# Patient Record
(demographics unavailable — no encounter records)

---

## 2024-11-25 NOTE — ASSESSMENT
[FreeTextEntry1] : We will do routine blood work in the form of CBC, CMP, A1c, lipid, TSH, B12 folate at this point. he may follow-up earlier if needed Pt was told to call with problems or concerns. The patient was told to seek immediate attention by calling 911 or going to the ER if his condition does not improve or gets acutely worse. Told to follow-up in 2 weeks for BP check and repeat CMP testing

## 2024-11-25 NOTE — PHYSICAL EXAM
[Normal] : normal rate, regular rhythm, normal S1 and S2 and no murmur heard [No Varicosities] : no varicosities [Pedal Pulses Present] : the pedal pulses are present [No Edema] : there was no peripheral edema [No Extremity Clubbing/Cyanosis] : no extremity clubbing/cyanosis [Soft] : abdomen soft [Non Tender] : non-tender [Non-distended] : non-distended [Normal Bowel Sounds] : normal bowel sounds [de-identified] : wax noted in the right ear  [de-identified] : Minimal swelling noted in the lower extremity bilaterally

## 2024-11-25 NOTE — HISTORY OF PRESENT ILLNESS
Detail Level: Detailed [FreeTextEntry1] : check up [de-identified] : 45-year-old male is here for checkup.  This is my first time seeing this patient.  Patient denies any fevers, chills, nausea, vomiting, diarrhea, constipation, chest pain, shortness of breath, weakness, numbness, tingling at this time.  Patient reports that he has had 6 COVID vaccines in the past.  Alcohol: Endorses. Patient reports that they drink alcohol occasionally (once a month).  Smoking: Denies. Patient reports that they have never smoked cigarettes.  Illicit Drugs: Denies. Patient reports that they do not use any recreational drugs.  Colonoscopy: Patient reports that they never had a colonoscopy.  He is interested in getting colon cancer screening via colonoscopy at this point. Diet: Patient reports that they are not that good with diet  Exercise: Patient reports that they are not good with exercise Living Situation: Alone.  Patient reports that they live alone.  Employment Status: Employed. 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  Education: Reports that the highest level of education is Graduate school (DNP). Relationship Status: Single.  Number of kids : 0  ADLs: Fully functional (bathing, dressing, toileting, transferring, walking, feeding).  Patient reports that they can perform ADLs IADLs: Fully functional and needs no help or supervision to perform IADLs (using the telephone, shopping, preparing meals, housekeeping, doing laundry, using transportation, managing medications and managing finances).  Patient reports that they can perform IADLs.

## 2024-12-17 NOTE — PHYSICAL EXAM
[Normal] : normal rate, regular rhythm, normal S1 and S2 and no murmur heard [No Varicosities] : no varicosities [Pedal Pulses Present] : the pedal pulses are present [No Edema] : there was no peripheral edema [No Extremity Clubbing/Cyanosis] : no extremity clubbing/cyanosis [Soft] : abdomen soft [Non Tender] : non-tender [Non-distended] : non-distended [Normal Bowel Sounds] : normal bowel sounds [de-identified] : wax noted in the right ear  [de-identified] : Minimal swelling noted in the lower extremity bilaterally

## 2024-12-17 NOTE — HISTORY OF PRESENT ILLNESS
[FreeTextEntry1] : follow up [de-identified] : 45-year-old male is here for checkup.  This is my first time seeing this patient.  Patient denies any fevers, chills, nausea, vomiting, diarrhea, constipation, chest pain, shortness of breath, weakness, numbness, tingling at this time.  Patient reports that he has had 6 COVID vaccines in the past.  Alcohol: Endorses. Patient reports that they drink alcohol occasionally (once a month).  Smoking: Denies. Patient reports that they have never smoked cigarettes.  Illicit Drugs: Denies. Patient reports that they do not use any recreational drugs.  Colonoscopy: Patient reports that they never had a colonoscopy.  He is interested in getting colon cancer screening via colonoscopy at this point.  He was given a referral to see the GI doctor in . Diet: Patient reports that they are not that good with diet.  Exercise: Patient reports that they are not good with exercise Living Situation: Alone.  Patient reports that they live alone.  Employment Status: Employed. 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  Education: Reports that the highest level of education is Graduate school (DNP). Relationship Status: Single.  Number of kids : 0  ADLs: Fully functional (bathing, dressing, toileting, transferring, walking, feeding).  Patient reports that they can perform ADLs IADLs: Fully functional and needs no help or supervision to perform IADLs (using the telephone, shopping, preparing meals, housekeeping, doing laundry, using transportation, managing medications and managing finances).  Patient reports that they can perform IADLs.

## 2024-12-17 NOTE — PHYSICAL EXAM
[Normal] : normal rate, regular rhythm, normal S1 and S2 and no murmur heard [No Varicosities] : no varicosities [Pedal Pulses Present] : the pedal pulses are present [No Edema] : there was no peripheral edema [No Extremity Clubbing/Cyanosis] : no extremity clubbing/cyanosis [Soft] : abdomen soft [Non Tender] : non-tender [Non-distended] : non-distended [Normal Bowel Sounds] : normal bowel sounds [de-identified] : wax noted in the right ear  [de-identified] : Minimal swelling noted in the lower extremity bilaterally

## 2024-12-17 NOTE — HISTORY OF PRESENT ILLNESS
[FreeTextEntry1] : follow up [de-identified] : 45-year-old male is here for checkup.  This is my first time seeing this patient.  Patient denies any fevers, chills, nausea, vomiting, diarrhea, constipation, chest pain, shortness of breath, weakness, numbness, tingling at this time.  Patient reports that he has had 6 COVID vaccines in the past.  Alcohol: Endorses. Patient reports that they drink alcohol occasionally (once a month).  Smoking: Denies. Patient reports that they have never smoked cigarettes.  Illicit Drugs: Denies. Patient reports that they do not use any recreational drugs.  Colonoscopy: Patient reports that they never had a colonoscopy.  He is interested in getting colon cancer screening via colonoscopy at this point.  He was given a referral to see the GI doctor in . Diet: Patient reports that they are not that good with diet.  Exercise: Patient reports that they are not good with exercise Living Situation: Alone.  Patient reports that they live alone.  Employment Status: Employed. 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  Education: Reports that the highest level of education is Graduate school (DNP). Relationship Status: Single.  Number of kids : 0  ADLs: Fully functional (bathing, dressing, toileting, transferring, walking, feeding).  Patient reports that they can perform ADLs IADLs: Fully functional and needs no help or supervision to perform IADLs (using the telephone, shopping, preparing meals, housekeeping, doing laundry, using transportation, managing medications and managing finances).  Patient reports that they can perform IADLs.

## 2025-01-17 NOTE — PHYSICAL EXAM
[Alert] : alert [Well Nourished] : well nourished [Obese] : obese [No Acute Distress] : no acute distress [Well Developed] : well developed [Normal Sclera/Conjunctiva] : normal sclera/conjunctiva [EOMI] : extra ocular movement intact [No Proptosis] : no proptosis [Normal Oropharynx] : the oropharynx was normal [Thyroid Not Enlarged] : the thyroid was not enlarged [No Thyroid Nodules] : no palpable thyroid nodules [No Respiratory Distress] : no respiratory distress [No Accessory Muscle Use] : no accessory muscle use [Clear to Auscultation] : lungs were clear to auscultation bilaterally [Normal S1, S2] : normal S1 and S2 [Normal Rate] : heart rate was normal [Regular Rhythm] : with a regular rhythm [No Edema] : no peripheral edema [Pedal Pulses Normal] : the pedal pulses are present [Normal Bowel Sounds] : normal bowel sounds [Not Tender] : non-tender [Not Distended] : not distended [Soft] : abdomen soft [Normal Anterior Cervical Nodes] : no anterior cervical lymphadenopathy [Normal Posterior Cervical Nodes] : no posterior cervical lymphadenopathy [No Spinal Tenderness] : no spinal tenderness [Spine Straight] : spine straight [No Stigmata of Cushings Syndrome] : no stigmata of Cushings Syndrome [Normal Gait] : normal gait [Normal Strength/Tone] : muscle strength and tone were normal [No Rash] : no rash [Acanthosis Nigricans] : no acanthosis nigricans [Normal Reflexes] : deep tendon reflexes were 2+ and symmetric [No Tremors] : no tremors [Oriented x3] : oriented to person, place, and time [de-identified] : healed scars, likely keyhole incisions

## 2025-01-17 NOTE — HISTORY OF PRESENT ILLNESS
[FreeTextEntry1] : 45 year M here for assessment of obesity  Patient is concerned about gradual generalized weight gain.    Follows a special diet: None Food Cravings: No Tried to lose weight before: Yes Tried any diet plans/ meal replacements for weight control: No Tried OTC or prescription medication for weight control: No   Activity level: typically  light activity with no scheduled physical activity      Ease of skin bruising: No Proximal muscle weakness: No Prior weight loss surgery: Sleeve gastrectomy with weight regain    Known history of the following:   Thyroid disease: No Heart disease: No Mood disorder: No Hypertension: yes Seizures: No Gall bladder disease: No Pancreatitis: No

## 2025-03-17 NOTE — ASSESSMENT
[FreeTextEntry1] : he may follow-up earlier if needed Pt was told to call with problems or concerns. The patient was told to seek immediate attention by calling 911 or going to the ER if his condition does not improve or gets acutely worse.  A total of 22 minutes was spent on this encounter.

## 2025-03-17 NOTE — HISTORY OF PRESENT ILLNESS
[FreeTextEntry1] : follow up for HTN [de-identified] : 45-year-old male is here for a follow-up appointment.  Patient is here for follow-up for medical comorbidities / medical problems.  Patient denies any fevers, chills, nausea, vomiting, diarrhea, constipation, chest pain, shortness of breath, weakness, numbness, tingling at this time.  Patient reports that he has had 6 COVID vaccines in the past.  Alcohol: Endorses. Patient reports that they drink alcohol occasionally (once a month).  Smoking: Denies. Patient reports that they have never smoked cigarettes.  Illicit Drugs: Denies. Patient reports that they do not use any recreational drugs.  Colonoscopy: Patient reports that they never had a colonoscopy.  He is interested in getting colon cancer screening via colonoscopy at this point.  He was given a referral to see the GI doctor in . Diet: Patient reports that they are not that good with diet.  Exercise: Patient reports that they are not good with exercise Living Situation: Alone.  Patient reports that they live alone.  Employment Status: Employed. 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  Education: Reports that the highest level of education is Graduate school (DNP). Relationship Status: Single.  Number of kids : 0  ADLs: Fully functional (bathing, dressing, toileting, transferring, walking, feeding).  Patient reports that they can perform ADLs IADLs: Fully functional and needs no help or supervision to perform IADLs (using the telephone, shopping, preparing meals, housekeeping, doing laundry, using transportation, managing medications and managing finances).  Patient reports that they can perform IADLs.

## 2025-03-17 NOTE — PHYSICAL EXAM
[Normal] : normal rate, regular rhythm, normal S1 and S2 and no murmur heard [No Varicosities] : no varicosities [Pedal Pulses Present] : the pedal pulses are present [No Edema] : there was no peripheral edema [No Extremity Clubbing/Cyanosis] : no extremity clubbing/cyanosis [Soft] : abdomen soft [Non Tender] : non-tender [Non-distended] : non-distended [Normal Bowel Sounds] : normal bowel sounds [de-identified] : wax noted in the right ear  [de-identified] : Minimal swelling noted in the lower extremity bilaterally

## 2025-04-03 NOTE — PHYSICAL EXAM
[Alert] : alert [Well Nourished] : well nourished [Obese] : obese [No Acute Distress] : no acute distress [Well Developed] : well developed [Normal Sclera/Conjunctiva] : normal sclera/conjunctiva [EOMI] : extra ocular movement intact [No Proptosis] : no proptosis [Normal Oropharynx] : the oropharynx was normal [Thyroid Not Enlarged] : the thyroid was not enlarged [No Thyroid Nodules] : no palpable thyroid nodules [No Respiratory Distress] : no respiratory distress [No Accessory Muscle Use] : no accessory muscle use [Clear to Auscultation] : lungs were clear to auscultation bilaterally [Normal S1, S2] : normal S1 and S2 [Normal Rate] : heart rate was normal [Regular Rhythm] : with a regular rhythm [No Edema] : no peripheral edema [Pedal Pulses Normal] : the pedal pulses are present [Normal Bowel Sounds] : normal bowel sounds [Not Tender] : non-tender [Not Distended] : not distended [Soft] : abdomen soft [Normal Anterior Cervical Nodes] : no anterior cervical lymphadenopathy [No Spinal Tenderness] : no spinal tenderness [Spine Straight] : spine straight [No Stigmata of Cushings Syndrome] : no stigmata of Cushings Syndrome [Normal Gait] : normal gait [Normal Strength/Tone] : muscle strength and tone were normal [No Rash] : no rash [Normal Reflexes] : deep tendon reflexes were 2+ and symmetric [No Tremors] : no tremors [Oriented x3] : oriented to person, place, and time [Acanthosis Nigricans] : no acanthosis nigricans [de-identified] : healed scars, likely keyhole incisions

## 2025-04-03 NOTE — HISTORY OF PRESENT ILLNESS
[FreeTextEntry1] : 45 year M here for assessment of obesity  Prescribed Zebpound, currently on 5mg Q qweekly (no n/v/d)   Exercise: limited by MSK issue in [place] Diet:  smoothies     Known history of the following:   Thyroid disease: No Heart disease: No Mood disorder: No Hypertension: yes Seizures: No Gall bladder disease: No Pancreatitis: No

## 2025-07-03 NOTE — HISTORY OF PRESENT ILLNESS
[FreeTextEntry1] : 45 year M here for assessment of obesity  Prescribed Zebpound, currently on 10mg Q qweekly (no n/v/d)   Exercise: limited by MSK issue in [place] Diet:  smoothies     Known history of the following:   Thyroid disease: No Heart disease: No Mood disorder: No Hypertension: yes Seizures: No Gall bladder disease: No Pancreatitis: No

## 2025-07-03 NOTE — PHYSICAL EXAM
[Alert] : alert [Well Nourished] : well nourished [Obese] : obese [No Acute Distress] : no acute distress [Well Developed] : well developed [Normal Sclera/Conjunctiva] : normal sclera/conjunctiva [EOMI] : extra ocular movement intact [No Proptosis] : no proptosis [Normal Oropharynx] : the oropharynx was normal [Thyroid Not Enlarged] : the thyroid was not enlarged [No Thyroid Nodules] : no palpable thyroid nodules [No Respiratory Distress] : no respiratory distress [No Accessory Muscle Use] : no accessory muscle use [Clear to Auscultation] : lungs were clear to auscultation bilaterally [Normal S1, S2] : normal S1 and S2 [Normal Rate] : heart rate was normal [Regular Rhythm] : with a regular rhythm [No Edema] : no peripheral edema [Pedal Pulses Normal] : the pedal pulses are present [Normal Bowel Sounds] : normal bowel sounds [Not Tender] : non-tender [Not Distended] : not distended [Soft] : abdomen soft [Normal Anterior Cervical Nodes] : no anterior cervical lymphadenopathy [No Spinal Tenderness] : no spinal tenderness [Spine Straight] : spine straight [No Stigmata of Cushings Syndrome] : no stigmata of Cushings Syndrome [Normal Gait] : normal gait [Normal Strength/Tone] : muscle strength and tone were normal [No Rash] : no rash [Normal Reflexes] : deep tendon reflexes were 2+ and symmetric [No Tremors] : no tremors [Oriented x3] : oriented to person, place, and time [Acanthosis Nigricans] : no acanthosis nigricans [de-identified] : healed scars, likely keyhole incisions